# Patient Record
Sex: FEMALE | Race: BLACK OR AFRICAN AMERICAN | NOT HISPANIC OR LATINO | URBAN - METROPOLITAN AREA
[De-identification: names, ages, dates, MRNs, and addresses within clinical notes are randomized per-mention and may not be internally consistent; named-entity substitution may affect disease eponyms.]

---
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
## 2020-03-30 NOTE — ED ADULT TRIAGE NOTE - NS ED NURSE BANDS TYPE
- creatinine up to 4 on 3/28, basline about 1.3. Improved after volume resuscitation.   - trend creatinine  - started on empiric heparin 3/29 for renal dysfunction and suspicion of microangiopathic coagulopathy.   - trialysis line in the case of need for HD given renal failure incidence with COVID  - nephro consulted in setting of severe hyperkalemia which has improved    Name band;